# Patient Record
Sex: MALE | Race: ASIAN | URBAN - METROPOLITAN AREA
[De-identification: names, ages, dates, MRNs, and addresses within clinical notes are randomized per-mention and may not be internally consistent; named-entity substitution may affect disease eponyms.]

---

## 2019-06-26 ENCOUNTER — OFFICE VISIT (OUTPATIENT)
Dept: PSYCHIATRY | Facility: CLINIC | Age: 29
End: 2019-06-26
Payer: COMMERCIAL

## 2019-06-26 DIAGNOSIS — F33.1 DEPRESSION, MAJOR, RECURRENT, MODERATE: Primary | ICD-10-CM

## 2019-06-26 DIAGNOSIS — F40.10 SOCIAL ANXIETY DISORDER: ICD-10-CM

## 2019-06-26 PROCEDURE — 99999 PR PBB SHADOW E&M-NEW PATIENT-LVL I: CPT | Mod: PBBFAC,,, | Performed by: SOCIAL WORKER

## 2019-06-26 PROCEDURE — 99999 PR PBB SHADOW E&M-NEW PATIENT-LVL I: ICD-10-PCS | Mod: PBBFAC,,, | Performed by: SOCIAL WORKER

## 2019-06-26 PROCEDURE — 90791 PR PSYCHIATRIC DIAGNOSTIC EVALUATION: ICD-10-PCS | Mod: S$GLB,,, | Performed by: SOCIAL WORKER

## 2019-06-26 PROCEDURE — 90791 PSYCH DIAGNOSTIC EVALUATION: CPT | Mod: S$GLB,,, | Performed by: SOCIAL WORKER

## 2019-06-26 NOTE — PROGRESS NOTES
Psychiatry Initial Visit (PhD/LCSW)  Diagnostic Interview - CPT 97775    Date: 6/26/2019    Site: [facility]    Referral source: self    Clinical status of patient: Outpatient    Diana Vance, a 28 y.o. male, for initial evaluation visit.  Met with patient.    Chief complaint/reason for encounter: depression and anxiety    History of present illness: Pt states he is here to establish treatment with mental health providers.  He has a long history of depression and social anxiety going back to high school when he first received treatment for it.  He recently (February) moved to [place] following graduate school in [place].  Pt was born in [place] and spent his early childhood there.  Family then moved to [facility] where he went to debbie high and high school.  He disliked his high school and feels he did not learn social skills due to no friends there.  He was sent to college in [place] where he got very depressed for his first 2 years, then went on a debbie year abroad in [place] and felt he blossomed, got out of his depression and returned to college in [place] and was very active in many things and enjoyed himself.  He then went to graduate school in [place] and got a degree in Business .  He just got a job with a new company in [place].  He is finding it difficult to make friends but has made an effort to get involved in yoga classes, a gym.  He feels he doesn't know how to talk to people at work even though they are all around his age.  He was excited when I mentioned group therapy as a possibility to help him.      Pain: 0    Symptoms:   · Mood: depressed mood, weight gain and social isolation  · Anxiety: social phobia  · Substance abuse: denied  · Cognitive functioning: denied  · Health behaviors: noncontributory    Psychiatric history: has participated in counseling/psychotherapy on an outpatient basis in the past    Medical history: noncontributory    Family history of  psychiatric illness: none    Social history (marriage, employment, etc.): Recently moved to [place]. Heterosexual.  He has a younger sister living in [place] who he is close to.  His parents live in [facility].  Bright and motivated for change.    Substance use:   Alcohol: social   Drugs: none   Tobacco: none   Caffeine: none    Current medications and drug reactions (include OTC, herbal): see medication list:  Lithium, Lexapro, Abilify    Strengths and liabilities: Strength: Patient accepts guidance/feedback, Strength: Patient is expressive/articulate., Strength: Patient is intelligent., Strength: Patient is motivated for change., Strength: Patient is physically healthy., Strength: Patient has reasonable judgment., Strength: Patient is stable., Liability: Patient lacks social skills., Liability: Patient has no suport network., Liability: Patient lacks coping skills.    Current Evaluation:     Mental Status Exam:  General Appearance:  unremarkable, age appropriate, overweight   Speech: normal tone, normal rate, normal pitch, normal volume      Level of Cooperation: cooperative      Thought Processes: normal and logical   Mood: steady      Thought Content: normal, no suicidality, no homicidality, delusions, or paranoia   Affect: congruent and appropriate   Orientation: Oriented x3   Memory: intact   Attention Span & Concentration: intact   Fund of General Knowledge: intact and appropriate to age and level of education   Abstract Reasoning: did not assess   Judgment & Insight: good     Language  intact     Diagnostic Impression - Plan:       ICD-10-CM ICD-9-CM   1. Depression, major, recurrent, moderate F33.1 296.32   2. Social anxiety disorder F40.10 300.23       Plan:individual psychotherapy, group psychotherapy and consult psychiatrist for medication evaluation    Return to Clinic: 3 weeks    Length of Service (minutes): 45

## 2019-07-01 ENCOUNTER — OFFICE VISIT (OUTPATIENT)
Dept: PSYCHIATRY | Facility: CLINIC | Age: 29
End: 2019-07-01
Payer: COMMERCIAL

## 2019-07-01 DIAGNOSIS — F40.10 SOCIAL ANXIETY DISORDER: Primary | ICD-10-CM

## 2019-07-01 PROCEDURE — 99999 PR PBB SHADOW E&M-EST. PATIENT-LVL I: ICD-10-PCS | Mod: PBBFAC,,, | Performed by: SOCIAL WORKER

## 2019-07-01 PROCEDURE — 99999 PR PBB SHADOW E&M-EST. PATIENT-LVL I: CPT | Mod: PBBFAC,,, | Performed by: SOCIAL WORKER

## 2019-07-01 PROCEDURE — 90832 PSYTX W PT 30 MINUTES: CPT | Mod: S$GLB,,, | Performed by: SOCIAL WORKER

## 2019-07-01 PROCEDURE — 90832 PR PSYCHOTHERAPY W/PATIENT, 30 MIN: ICD-10-PCS | Mod: S$GLB,,, | Performed by: SOCIAL WORKER

## 2019-07-01 NOTE — PROGRESS NOTES
Individual Psychotherapy (PhD/LCSW)    7/1/2019    Site:  [facility]         Therapeutic Intervention: Met with patient.  Outpatient - Insight oriented psychotherapy 30 min - CPT code 74497    Chief complaint/reason for encounter: anxiety, behavior and interpersonal     Interval history and content of current session: referral for group therapy from Dr. Rivera, client has social anxiety, new to the area, is professional, wants to work on social and peer skills, reducing anxiety, and improving his self-esteem, and feeling more confident about taking risks. Will start the Wednesday 5:15 PM group this week. Went over guidelines, including informed consent. And how to be in the group and how it works and open up and talk, I have consulted with Dr. Rivera on the client also, he sees an MD here in August.    Treatment plan:  · Target symptoms: anxiety , adjustment  · Why chosen therapy is appropriate versus another modality: relevant to diagnosis, patient responds to this modality, evidence based practice  · Outcome monitoring methods: self-report, observation  · Therapeutic intervention type: insight oriented psychotherapy, behavior modifying psychotherapy, supportive psychotherapy    Risk parameters:  Patient reports no suicidal ideation  Patient reports no homicidal ideation  Patient reports no self-injurious behavior  Patient reports no violent behavior    Verbal deficits: None    Patient's response to intervention:  The patient's response to intervention is accepting.    Progress toward goals and other mental status changes:  The patient's progress toward goals is fair .    Diagnosis:     ICD-10-CM ICD-9-CM   1. Social anxiety disorder F40.10 300.23       Plan:  individual psychotherapy, group psychotherapy and consult psychiatrist for medication evaluation    Return to clinic: 1 week    Length of Service (minutes): 30

## 2019-07-03 ENCOUNTER — OFFICE VISIT (OUTPATIENT)
Dept: PSYCHIATRY | Facility: CLINIC | Age: 29
End: 2019-07-03
Payer: COMMERCIAL

## 2019-07-03 DIAGNOSIS — F40.10 SOCIAL ANXIETY DISORDER: Primary | ICD-10-CM

## 2019-07-03 PROCEDURE — 99999 PR PBB SHADOW E&M-EST. PATIENT-LVL I: ICD-10-PCS | Mod: PBBFAC,,, | Performed by: SOCIAL WORKER

## 2019-07-03 PROCEDURE — 99999 PR PBB SHADOW E&M-EST. PATIENT-LVL I: CPT | Mod: PBBFAC,,, | Performed by: SOCIAL WORKER

## 2019-07-03 PROCEDURE — 90853 GROUP PSYCHOTHERAPY: CPT | Mod: PBBFAC | Performed by: SOCIAL WORKER

## 2019-07-03 PROCEDURE — 90853 PR GROUP PSYCHOTHERAPY: ICD-10-PCS | Mod: S$GLB,,, | Performed by: SOCIAL WORKER

## 2019-07-03 PROCEDURE — 90853 GROUP PSYCHOTHERAPY: CPT | Mod: S$GLB,,, | Performed by: SOCIAL WORKER

## 2019-07-30 ENCOUNTER — PATIENT MESSAGE (OUTPATIENT)
Dept: PSYCHIATRY | Facility: CLINIC | Age: 29
End: 2019-07-30

## 2019-08-13 ENCOUNTER — OFFICE VISIT (OUTPATIENT)
Dept: PSYCHIATRY | Facility: CLINIC | Age: 29
End: 2019-08-13
Payer: COMMERCIAL

## 2019-08-13 ENCOUNTER — PATIENT MESSAGE (OUTPATIENT)
Dept: PSYCHIATRY | Facility: CLINIC | Age: 29
End: 2019-08-13

## 2019-08-13 VITALS
HEART RATE: 89 BPM | BODY MASS INDEX: 37.26 KG/M2 | DIASTOLIC BLOOD PRESSURE: 81 MMHG | HEIGHT: 71 IN | SYSTOLIC BLOOD PRESSURE: 131 MMHG | WEIGHT: 266.13 LBS

## 2019-08-13 DIAGNOSIS — F40.10 SOCIAL ANXIETY DISORDER: Primary | ICD-10-CM

## 2019-08-13 DIAGNOSIS — F33.40 MDD (RECURRENT MAJOR DEPRESSIVE DISORDER) IN REMISSION: ICD-10-CM

## 2019-08-13 PROCEDURE — 99999 PR PBB SHADOW E&M-EST. PATIENT-LVL II: CPT | Mod: PBBFAC,,, | Performed by: PSYCHIATRY & NEUROLOGY

## 2019-08-13 PROCEDURE — 99214 PR OFFICE/OUTPT VISIT, EST, LEVL IV, 30-39 MIN: ICD-10-PCS | Mod: S$GLB,,, | Performed by: PSYCHIATRY & NEUROLOGY

## 2019-08-13 PROCEDURE — 99214 OFFICE O/P EST MOD 30 MIN: CPT | Mod: S$GLB,,, | Performed by: PSYCHIATRY & NEUROLOGY

## 2019-08-13 PROCEDURE — 99999 PR PBB SHADOW E&M-EST. PATIENT-LVL II: ICD-10-PCS | Mod: PBBFAC,,, | Performed by: PSYCHIATRY & NEUROLOGY

## 2019-08-13 RX ORDER — ARIPIPRAZOLE 10 MG/1
10 TABLET ORAL DAILY
Qty: 30 TABLET | Refills: 3 | Status: SHIPPED | OUTPATIENT
Start: 2019-08-13 | End: 2019-12-09 | Stop reason: SDUPTHER

## 2019-08-13 RX ORDER — TRIHEXYPHENIDYL HYDROCHLORIDE 2 MG/1
2 TABLET ORAL DAILY
Qty: 30 TABLET | Refills: 3 | Status: SHIPPED | OUTPATIENT
Start: 2019-08-13 | End: 2019-12-09 | Stop reason: SDUPTHER

## 2019-08-13 RX ORDER — ESCITALOPRAM OXALATE 20 MG/1
20 TABLET ORAL DAILY
Qty: 30 TABLET | Refills: 3 | Status: SHIPPED | OUTPATIENT
Start: 2019-08-13 | End: 2019-12-09 | Stop reason: SDUPTHER

## 2019-08-13 NOTE — PROGRESS NOTES
Outpatient Psychiatry Initial Visit (MD/NP)    8/13/2019    Diana Vance, a 29 y.o. male, presenting for initial evaluation visit. Met with patient and chart reviewed. Pt saw Dr. Forrest on 6/26 l dx with social anxiety disorder and MDD, was referred for medication management    Reason for Encounter: Referral from Dr. Forrest. Patient complains of No chief complaint on file.      History of Present Illness:     Patient saw Dr. Forrest on 6/26, Per note:  Pt states he is here to establish treatment with mental health providers.  He has a long history of depression and social anxiety going back to high school when he first received treatment for it.  He recently (February) moved to [place] following graduate school in [place].  Pt was born in [place] and spent his early childhood there.  Family then moved to [facility] where he went to debbie high and high school.  He disliked his high school and feels he did not learn social skills due to no friends there.  He was sent to college in [place] where he got very depressed for his first 2 years, then went on a debbie year abroad in [place] and felt he blossomed, got out of his depression and returned to college in [place] and was very active in many things and enjoyed himself.  He then went to graduate school in [place] and got a degree in Business .  He just got a job with a new company in [place].  He is finding it difficult to make friends but has made an effort to get involved in yoga classes, a gym.  He feels he doesn't know how to talk to people at work even though they are all around his age.  He was excited when I mentioned group therapy as a possibility to help him.        Today pt states that he needs refills of his medications. He recently moved from [place] to [facility] in February after he got a job as a . He had a psychiatrist in [place] who prescribed his medications and Is interested in reducing doses of his current meds.  "Pt reports a h/o anxiety and depression and brings medications bottles to appointment today.  Meds: abilify 10 mg qdaym lexapro 20 mg, trihexyphenidyl 2 mg daily, lithium 450 mg BID. Pt states he has been on the lithium and lexapro for 5-6 years. They were started when he was in [facility], states that he was started on lithium when he was having suicidal thoughts and denies a h/o bipolar disorder. Denies previous psych hospitalizations. Harini screen negative as below.  He has been on the artane and abilify about  2-3 years. He states that since he has been on all 4  He feels that he is doing "ok" and describes his mood as "good". Pt states he has been has been settling into job and has been going to [employer] and doing yoga to get in shape. Energy is "good" (pt states he has been more active), denies anhedonia (enjoys photography), denies guilt/hopelessness, reports good appetite. Says he has "always" had difficulties concentrating but meditation has helped, sleeps well (states he gets at least 8 hours) .  Pt states that he first noticed he was depressed and anxious while in high school in [place]. He moved to [place] for [facility] and reached out to mental health services for the first time. This was when he was started on lexapro and lithium as above. He then moved to [place] and completed grad school.     Denies periods of 4+days or irritable/elevated mood with decreased need for sleep, increased energy, increased goal directed. Endorses talkativeness and racing thoughts at times. Denies grandiosity.  Denies AVH/paranoia/TI/TW/TB.Denies SI/HI. He states that he worries about what other people think about him. States he has social anxiety and fears that people may  him, denies that it prevents him from doig activities that he wants to do. States that social situations usually provokes his anxiety and recognizes that it is out of proportion to the stressor. Pt states that overall he feels that his social " anxiety has improved as he recently went to a speed dating event.   Describes normal day as going to work from 9-5, goes to the gym or does yoga/working out/spending time with friends, eats dinner, watch TV.    Has lost 13 lbs over the past couple months, wants to lose another 50-55    When discussing lithium level he is unsure when he last had a lithium level  And is unable to recall any previous numbers. Endorses occasional marijuana and etoh intake      Psych History  Previous Medication Trials: as above only  Previous Psychiatric Hospitalizations: no  Previous Suicide Attempts: denies  History of Violence: denies  Outpatient Psychiatrist: previously saw MD in [place]     Social History:  Marital Status: single  Children: no  Employment Status/Info: data analyast  Education: [employer] grad school  Special Ed: denies  : denies  Scientologist: raised Sabianism, but says he is not Restorationist.  Housing Status: alone  Hobbies/Leisure time: photography, reading books , yoga, working out  History of phys/sexual abuse: no  Access to gun: no  H/o head trauma: denies  Seizure: denes     Family Psychiatric History:   denies     Substance Abuse History:  Recreational Drugs: occasional marijuana. Denies synthetic marijuana  Use of Alcohol: yes, occasional when he is with friends  Rehab History: denies  Tobacco Use: denies  Use of Caffeine: 1 cup of coffee/day  Use of OTC: occasional tylenol  Legal consequences of chemical use:denies     Legal History:  Past Charges/Incarcerations: denies  Pending charges:no       Review Of Systems:     GENERAL:  No weight gain or loss  SKIN:  No rashes  HEAD:  No headaches  EARS:  No ddizzinessor hearing loss  NOSE:  No changes in smell  MOUTH & THROAT:  No dyskinetic movements or obvious goiter  CHEST:  No shortness of breath, + cough  CARDIOVASCULAR:  No tachycardia or chest pain  ABDOMEN:  No nausea, vomiting, pain, constipation or diarrhea  URINARY:  No frequency, dysuria  "  MUSCULOSKELETAL:  No pain or stiffness of the joints  NEUROLOGIC:  No weakness, sensory changes, seizures, confusion, memory loss, tremor or other abnormal movements  Pertinent items are noted in HPI.    Current Evaluation:     Nutritional Screening: Considering the patient's height and weight, medications, medical history and preferences, should a referral be made to the dietitian? yes    Constitutional  Vitals:  Most recent vital signs, dated less than 90 days prior to this appointment, were reviewed.    Vitals:    08/13/19 0827   BP: 131/81   Pulse: 89   Weight: 120.7 kg (266 lb 1.5 oz)   Height: 5' 11" (1.803 m)        General:  unremarkable, age appropriate     Musculoskeletal  Muscle Strength/Tone:  no spasicity, no rigidity, no dystonia, no tremor   Gait & Station:  non-ataxic     Psychiatric  Speech:  no latency; no press   Mood & Affect:  happy  congruent and appropriate   Thought Process:  normal and logical   Associations:  intact   Thought Content:  normal, no suicidality, no homicidality, delusions, or paranoia   Insight:  intact   Judgement: behavior is adequate to circumstances   Orientation:  grossly intact   Memory: intact for content of interview   Language: grossly intact   Attention Span & Concentration:  able to focus   Fund of Knowledge:  intact and appropriate to age and level of education, 4 of 4 recent presidents       Relevant Elements of Neurological Exam: normal gait    Laboratory Data  No visits with results within 1 Month(s) from this visit.   Latest known visit with results is:   No results found for any previous visit.         Medications  No outpatient encounter medications on file as of 8/13/2019.     No facility-administered encounter medications on file as of 8/13/2019.            Assessment - Diagnosis - Goals:     Impression:   30 yo male with h/o reported depression and social anxiety disorder who presents to establish care and get medications refilled after recent move from [place] to [place]. On evaluation pt reports that his mood is "good" and denies feeling depression or anhedonic. Govind and psychosis screen negative and No Objective s/sx of psychosis or govind on interview and exam. Pt presents with desire to simplify medication regimen; he presents with somewhat complicated medication regimen of 20 mg lexapro, 10 mg abilify, 2mg artane and lithium 450 mg BID. Unclear why pt is on current regimen , as he does not appear by hx  to have ever met criteria for bipolar or SAD nd does not appear objectively psychotic or manic currently. Discussed indications and risks/benefits of all current medications and pt agreeable to decrease lithium to 450 mg qhs for now. Pt also agreeable to release information from previous provider-sign CARLA and will fax to previous MD.     MDD, in remission currently  R/O schizoaffective disorder, R/o Bipolar disorder  Social   Anxiety disorder      No diagnosis found.    Strengths and Liabilities: Strength: Patient accepts guidance/feedback, Strength: Patient is expressive/articulate., Strength: Patient is intelligent., Strength: Patient is motivated for change., Strength: Patient has reasonable judgment.    Treatment Goals:  Specify outcomes written in observable, behavioral terms:   Anxiety: eliminating avoidance (specify : going out more with friends)  Depression: acquiring relapse prevention skills    Treatment Plan/Recommendations:   · Will decrease lithium 450 qhs. Consider lithium level at next appintment  · Continue lexapro 20 mg   · Continue artane 2 mg   · Continue abilify 10 mg  · Will consider further med adjustments once receive previous records  · Continue therapy with Dr. Forrest        Return to Clinic: 1 month    Counseling time: 15  Total time: 60  Case discussed with Dr. Britany Fraire MD  [facility] PGY3  "

## 2019-08-27 NOTE — PROGRESS NOTES
STAFF NOTE:  I have seen the pt and I have fully reviewed patient's medical record. Case formally discussed with resident and I concur with the resident's history, assessment, and recommendations.

## 2019-09-03 ENCOUNTER — OFFICE VISIT (OUTPATIENT)
Dept: INTERNAL MEDICINE | Facility: CLINIC | Age: 29
End: 2019-09-03
Payer: COMMERCIAL

## 2019-09-03 VITALS
HEIGHT: 71 IN | SYSTOLIC BLOOD PRESSURE: 114 MMHG | TEMPERATURE: 100 F | HEART RATE: 93 BPM | DIASTOLIC BLOOD PRESSURE: 70 MMHG | WEIGHT: 271.63 LBS | BODY MASS INDEX: 38.03 KG/M2

## 2019-09-03 DIAGNOSIS — J40 BRONCHITIS: ICD-10-CM

## 2019-09-03 DIAGNOSIS — E66.9 OBESITY (BMI 30-39.9): ICD-10-CM

## 2019-09-03 DIAGNOSIS — R05.9 COUGH: ICD-10-CM

## 2019-09-03 DIAGNOSIS — R06.2 WHEEZING: Primary | ICD-10-CM

## 2019-09-03 PROCEDURE — 99999 PR PBB SHADOW E&M-EST. PATIENT-LVL IV: ICD-10-PCS | Mod: PBBFAC,,, | Performed by: NURSE PRACTITIONER

## 2019-09-03 PROCEDURE — 96372 THER/PROPH/DIAG INJ SC/IM: CPT | Mod: S$GLB,,, | Performed by: NURSE PRACTITIONER

## 2019-09-03 PROCEDURE — 96372 PR INJECTION,THERAP/PROPH/DIAG2ST, IM OR SUBCUT: ICD-10-PCS | Mod: S$GLB,,, | Performed by: NURSE PRACTITIONER

## 2019-09-03 PROCEDURE — 99204 OFFICE O/P NEW MOD 45 MIN: CPT | Mod: 25,S$GLB,, | Performed by: NURSE PRACTITIONER

## 2019-09-03 PROCEDURE — 99204 PR OFFICE/OUTPT VISIT, NEW, LEVL IV, 45-59 MIN: ICD-10-PCS | Mod: 25,S$GLB,, | Performed by: NURSE PRACTITIONER

## 2019-09-03 PROCEDURE — 99999 PR PBB SHADOW E&M-EST. PATIENT-LVL IV: CPT | Mod: PBBFAC,,, | Performed by: NURSE PRACTITIONER

## 2019-09-03 RX ORDER — AZITHROMYCIN 250 MG/1
TABLET, FILM COATED ORAL
Qty: 6 TABLET | Refills: 0 | Status: SHIPPED | OUTPATIENT
Start: 2019-09-03 | End: 2019-09-08

## 2019-09-03 RX ORDER — TRIAMCINOLONE ACETONIDE 40 MG/ML
40 INJECTION, SUSPENSION INTRA-ARTICULAR; INTRAMUSCULAR ONCE
Status: COMPLETED | OUTPATIENT
Start: 2019-09-03 | End: 2019-09-03

## 2019-09-03 RX ADMIN — TRIAMCINOLONE ACETONIDE 40 MG: 40 INJECTION, SUSPENSION INTRA-ARTICULAR; INTRAMUSCULAR at 08:09

## 2019-09-03 NOTE — PROGRESS NOTES
[facility] Note    Chief Complaint      Chief Complaint   Patient presents with    Annual Exam     History of Present Illness      Diana Vance is a 29 y.o. male patient new to me with chronic conditions of social anxiety d/o and depression who presents today for c/o new problem of worsening sore throat, chest congestion, cough with yellow phlegm production and feelings of fever but not measured for the past week. Pt denies sob, cp, n/v/d.   Pt is requesting referral to a nutritionist for weight loss/management. Pt moved to [place] this past Feb for his job, has been under a lot of stress. He just recently joined [employer] and is working out twice a week w/o much weight loss.   BMI- 37.8   Social anxiety/depression- managed by psych    Problem List Items Addressed This Visit     None      Visit Diagnoses     Wheezing    -  Primary    Relevant Medications    azithromycin (Z-ARINA) 250 MG tablet    triamcinolone acetonide injection 40 mg (Completed)    Bronchitis        Relevant Medications    azithromycin (Z-ARINA) 250 MG tablet    triamcinolone acetonide injection 40 mg (Completed)    Cough        Relevant Medications    azithromycin (Z-ARINA) 250 MG tablet    triamcinolone acetonide injection 40 mg (Completed)    Obesity (BMI 30-39.9)        Relevant Orders    Ambulatory Referral to Nutrition Services          Health Maintenance   Topic Date Due    TETANUS VACCINE  07/02/2009    Lipid Panel  Completed       History reviewed. No pertinent past medical history.    History reviewed. No pertinent surgical history.    family history is not on file.     Social History     Tobacco Use    Smoking status: Never Smoker    Smokeless tobacco: Never Used   Substance Use Topics    Alcohol use: Not on file    Drug use: Not on file       Review of Systems   Constitutional: Positive for fever. Negative for chills.   HENT: Positive for congestion (chest) and sore throat. Negative for hearing loss  "and sinus pain.    Eyes: Negative for blurred vision and discharge.   Respiratory: Positive for cough and sputum production. Negative for shortness of breath and wheezing.    Cardiovascular: Negative for chest pain, palpitations and leg swelling.   Gastrointestinal: Negative for abdominal pain, blood in stool, constipation, diarrhea, nausea and vomiting.   Genitourinary: Negative for dysuria, hematuria and urgency.   Musculoskeletal: Negative for myalgias and neck pain.   Skin: Negative for rash.   Neurological: Negative for dizziness, weakness and headaches.   Endo/Heme/Allergies: Negative for polydipsia.   Psychiatric/Behavioral: Negative for depression. The patient is not nervous/anxious.         Outpatient Encounter Medications as of 9/3/2019   Medication Sig Dispense Refill    ARIPiprazole (ABILIFY) 10 MG Tab Take 1 tablet (10 mg total) by mouth once daily. 30 tablet 3    escitalopram oxalate (LEXAPRO) 20 MG tablet Take 1 tablet (20 mg total) by mouth once daily. 30 tablet 3    trihexyphenidyl (ARTANE) 2 MG tablet Take 1 tablet (2 mg total) by mouth once daily. 30 tablet 3    azithromycin (Z-ARINA) 250 MG tablet Take 2 tablets by mouth on day 1; Take 1 tablet by mouth on days 2-5 6 tablet 0     Facility-Administered Encounter Medications as of 9/3/2019   Medication Dose Route Frequency Provider Last Rate Last Dose    [COMPLETED] triamcinolone acetonide injection 40 mg  40 mg Intramuscular Once Alberta Ruiz NP   40 mg at 09/03/19 0836       Review of patient's allergies indicates:  No Known Allergies    Physical Exam      Vital Signs  Temp: 99.6 °F (37.6 °C)  Temp src: Oral  Pulse: 93  BP: 114/70  BP Location: Left arm  Patient Position: Sitting  Pain Score: 0-No pain  Height and Weight  Height: 5' 11" (180.3 cm)  Weight: 123.2 kg (271 lb 9.7 oz)  BSA (Calculated - sq m): 2.48 sq meters  BMI (Calculated): 38  Weight in (lb) to have BMI = 25: 178.9]    Physical Exam   Constitutional: He is oriented to " person, place, and time. He appears well-developed and well-nourished.   HENT:   Head: Normocephalic and atraumatic.   Slight redness noted to bilateral ear canal and oropharynx   Eyes: Pupils are equal, round, and reactive to light. Conjunctivae and EOM are normal.   Neck: Normal range of motion. Neck supple. No JVD present. No tracheal deviation present. No thyromegaly present.   Cardiovascular: Normal rate, regular rhythm, normal heart sounds and intact distal pulses.   Pulmonary/Chest: Effort normal and breath sounds normal. No respiratory distress.   Abdominal: Soft. Bowel sounds are normal. He exhibits no distension. There is no tenderness.   Musculoskeletal: Normal range of motion.   Lymphadenopathy:     He has no cervical adenopathy.   Neurological: He is alert and oriented to person, place, and time.   Skin: Skin is warm and dry. No rash noted. No erythema. No pallor.   Psychiatric: He has a normal mood and affect. His behavior is normal. Judgment and thought content normal.        Laboratory:  CBC:  No results for input(s): WBC, RBC, HGB, HCT, PLT, MCV, MCH, MCHC in the last 2160 hours.  CMP:  No results for input(s): GLU, CALCIUM, ALBUMIN, PROT, NA, K, CO2, CL, BUN, ALKPHOS, ALT, AST, BILITOT in the last 2160 hours.    Invalid input(s): CREATININ  URINALYSIS:  No results for input(s): COLORU, CLARITYU, SPECGRAV, PHUR, PROTEINUA, GLUCOSEU, BILIRUBINCON, BLOODU, WBCU, RBCU, BACTERIA, MUCUS, NITRITE, LEUKOCYTESUR, UROBILINOGEN, HYALINECASTS in the last 2160 hours.   LIPIDS:  No results for input(s): TSH, HDL, CHOL, TRIG, LDLCALC, CHOLHDL, NONHDLCHOL, TOTALCHOLEST in the last 2160 hours.  TSH:  No results for input(s): TSH in the last 2160 hours.  A1C:  No results for input(s): HGBA1C in the last 2160 hours.      Assessment/Plan     Diana Vance is a 29 y.o.male with:    1. Bronchitis  - azithromycin (Z-ARINA) 250 MG tablet; Take 2 tablets by mouth on day 1; Take 1 tablet by mouth on days 2-5   Dispense: 6 tablet; Refill: 0  - triamcinolone acetonide injection 40 mg    2. Wheezing  - azithromycin (Z-ARINA) 250 MG tablet; Take 2 tablets by mouth on day 1; Take 1 tablet by mouth on days 2-5  Dispense: 6 tablet; Refill: 0  - triamcinolone acetonide injection 40 mg    3. Cough  - azithromycin (Z-ARINA) 250 MG tablet; Take 2 tablets by mouth on day 1; Take 1 tablet by mouth on days 2-5  Dispense: 6 tablet; Refill: 0  - triamcinolone acetonide injection 40 mg    4. Obesity (BMI 30-39.9)  - Ambulatory Referral to Nutrition Services      -Continue current medications and maintain follow up with specialists.  Return to clinic in 3 months to establish with PCP, as a f/u with weight loss/health and annual labs.         Erin Jiminez, NP-C [facility]

## 2019-09-03 NOTE — PATIENT INSTRUCTIONS
Referred to nutritionist for weight loss management/health  Complete meds prescribed  F/u with 3 months to establish with PCP, annual labs and to f/u with weight loss.

## 2019-09-10 ENCOUNTER — OFFICE VISIT (OUTPATIENT)
Dept: PSYCHIATRY | Facility: CLINIC | Age: 29
End: 2019-09-10
Payer: COMMERCIAL

## 2019-09-10 VITALS
DIASTOLIC BLOOD PRESSURE: 75 MMHG | WEIGHT: 269.94 LBS | HEART RATE: 89 BPM | BODY MASS INDEX: 37.65 KG/M2 | SYSTOLIC BLOOD PRESSURE: 136 MMHG

## 2019-09-10 DIAGNOSIS — Z00.00 HEALTHCARE MAINTENANCE: Primary | ICD-10-CM

## 2019-09-10 DIAGNOSIS — F33.40 MDD (RECURRENT MAJOR DEPRESSIVE DISORDER) IN REMISSION: ICD-10-CM

## 2019-09-10 DIAGNOSIS — F40.10 SOCIAL ANXIETY DISORDER: ICD-10-CM

## 2019-09-10 PROCEDURE — 99999 PR PBB SHADOW E&M-EST. PATIENT-LVL II: CPT | Mod: PBBFAC,,, | Performed by: PSYCHIATRY & NEUROLOGY

## 2019-09-10 PROCEDURE — 99212 OFFICE O/P EST SF 10 MIN: CPT | Mod: S$GLB,,, | Performed by: PSYCHIATRY & NEUROLOGY

## 2019-09-10 PROCEDURE — 99212 PR OFFICE/OUTPT VISIT, EST, LEVL II, 10-19 MIN: ICD-10-PCS | Mod: S$GLB,,, | Performed by: PSYCHIATRY & NEUROLOGY

## 2019-09-10 PROCEDURE — 99999 PR PBB SHADOW E&M-EST. PATIENT-LVL II: ICD-10-PCS | Mod: PBBFAC,,, | Performed by: PSYCHIATRY & NEUROLOGY

## 2019-09-10 NOTE — PROGRESS NOTES
"Outpatient Psychiatry Follow-Up Visit (MD/NP)    9/10/2019    Clinical Status of Patient:  Outpatient (Ambulatory)    Chief Complaint:  Diana Vance is a 29 y.o. male who presents today for follow-up of depression, social anxiety disorder.  Met with patient.  Patient last seen by me 8/13/2019, at that time he was continued on lexapro 20 mg, artane 2 mg, and abilify 10 mg. Lithium was decreased to 450 qhs as it was unclear what the indications where. Requested records from previous provider, have not received any records back    Pt states that he has been doing well since his last appointment, he states he has been more social and made some new friends. Pt states that he is happy with the changes that were made at the last appointment andfeels that he is "starting to feel emotions again". He describes his mood as "better" and states that he only feel "depressed" when he is in his apartment alone. He states that the "depression" is more feelings of loneliness because these feelings resolve [facility]. Pt states that his job has been "good", no issues at work. Pt states that he has been sleeping 8-9 hours a day but on the weekends he has been sleeping 10-12 hours because he "has nothing to do" and is considering a new hobby. He is considering buying a nintendo switch and getting back into exercising. Pt states that since his last appointment he gained ~5 lbs which he attributes to going out with some family members when they were visiting him in town. Pt states that he saw a PCP who had recommended a nutrionist and his hoping that will aid in weight loss as he feels his confidence will improve with wr loss. Denies feeling anxious and states when he does he meditates and it improves. No sx c/w govind or psychosis; not objectively manic pr psychotic, denie sSI/HI/AVH. Pt describes himself as "more positive", states that he is more hopeful than he has in a long time in regards to " "controlling his depression.      Review of Systems   · PSYCHIATRIC: Pertinant items are noted in the narrative.    Past Medical, Family and Social History: The patient's past medical, family and social history have been reviewed and updated as appropriate within the electronic medical record - see encounter notes.    Compliance: yes    Side effects: None    Risk Parameters:  Patient reports no suicidal ideation  Patient reports no homicidal ideation  Patient reports no self-injurious behavior  Patient reports no violent behavior    Exam (detailed: at least 9 elements; comprehensive: all 15 elements)   Constitutional  Vitals:  Most recent vital signs, dated less than 90 days prior to this appointment, were reviewed.   Vitals:    09/10/19 0929   BP: 136/75   Pulse: 89   Weight: 122.4 kg (269 lb 15.3 oz)        General:  unremarkable, age appropriate, casually dressed     Musculoskeletal  Muscle Strength/Tone:  no spasicity, no rigidity, no flaccidity, no dyskinesia, no tremor   Gait & Station:  non-ataxic, steady     Psychiatric  Speech:  no latency; no press   Mood & Affect:  "better"  congruent and appropriate, bright, full, reactive,    Thought Process:  normal and logical   Associations:  intact   Thought Content:  normal, no suicidality, no homicidality, delusions, or paranoia, patient making jokes   Insight:  intact, has awareness of illness   Judgement: behavior is adequate to circumstances   Orientation:  grossly intact   Memory: intact for content of interview   Language: grossly intact   Attention Span & Concentration:  able to focus   Fund of Knowledge:  intact and appropriate to age and level of education     Assessment and Diagnosis   Status/Progress: Based on the examination today, the patient's problem(s) is/are improved.  New problems have not been presented today.   Diagnostic uncertainty are complicating management of the primary condition.  The working differential for this patient includes MDD and " social anxiety disorder. Would like to r/o SAD and bipolar disorder as patient presnted on a complicated medication regimen. No objective evidence of psychosis or govind at this time. Pt appears to be doing well on decreased lithium dose, but would like to obtain records from previous provider prior to discontinuing medication.     General Impression:     MDD, in remission currently  R/O schizoaffective disorder, R/o Bipolar disorder  Social Anxiety disorder          Intervention/Counseling/Treatment Plan       Treatment Goals:  Specify outcomes written in observable, behavioral terms:   Anxiety: eliminating avoidance (specify : going out more with friends)  Depression: acquiring relapse prevention skills     Treatment Plan/Recommendations:   · Continue lithium 450 qhs.   · Continue lexapro 20 mg   · Continue artane 2 mg   · Continue abilify 10 mg  · Will consider further med adjustments once receive previous records-pt signed CARLA at last visit 8/13-awaiting records  · Ordered lithium level and lithium monitoring labs today 9/10/19  · Continue therapy with Dr. Forrest       Return to Clinic: 3 months or sooner if needed  Jordyn Fraire MD  [employer] [facility] PGY3

## 2019-12-03 ENCOUNTER — TELEPHONE (OUTPATIENT)
Dept: INTERNAL MEDICINE | Facility: CLINIC | Age: 29
End: 2019-12-03

## 2019-12-03 ENCOUNTER — OFFICE VISIT (OUTPATIENT)
Dept: INTERNAL MEDICINE | Facility: CLINIC | Age: 29
End: 2019-12-03
Payer: COMMERCIAL

## 2019-12-03 VITALS
WEIGHT: 264.13 LBS | HEIGHT: 71 IN | DIASTOLIC BLOOD PRESSURE: 82 MMHG | BODY MASS INDEX: 36.98 KG/M2 | SYSTOLIC BLOOD PRESSURE: 130 MMHG | TEMPERATURE: 99 F | RESPIRATION RATE: 18 BRPM | HEART RATE: 78 BPM

## 2019-12-03 DIAGNOSIS — Z00.00 ANNUAL PHYSICAL EXAM: Primary | ICD-10-CM

## 2019-12-03 DIAGNOSIS — F33.40 MDD (RECURRENT MAJOR DEPRESSIVE DISORDER) IN REMISSION: ICD-10-CM

## 2019-12-03 DIAGNOSIS — E66.9 OBESITY (BMI 30-39.9): ICD-10-CM

## 2019-12-03 DIAGNOSIS — F40.10 SOCIAL ANXIETY DISORDER: ICD-10-CM

## 2019-12-03 PROCEDURE — 99999 PR PBB SHADOW E&M-EST. PATIENT-LVL III: ICD-10-PCS | Mod: PBBFAC,,, | Performed by: INTERNAL MEDICINE

## 2019-12-03 PROCEDURE — 99395 PREV VISIT EST AGE 18-39: CPT | Mod: 25,S$GLB,, | Performed by: INTERNAL MEDICINE

## 2019-12-03 PROCEDURE — 90471 FLU VACCINE (QUAD) GREATER THAN OR EQUAL TO 3YO PRESERVATIVE FREE IM: ICD-10-PCS | Mod: S$GLB,,, | Performed by: INTERNAL MEDICINE

## 2019-12-03 PROCEDURE — 90686 FLU VACCINE (QUAD) GREATER THAN OR EQUAL TO 3YO PRESERVATIVE FREE IM: ICD-10-PCS | Mod: S$GLB,,, | Performed by: INTERNAL MEDICINE

## 2019-12-03 PROCEDURE — 99395 PR PREVENTIVE VISIT,EST,18-39: ICD-10-PCS | Mod: 25,S$GLB,, | Performed by: INTERNAL MEDICINE

## 2019-12-03 PROCEDURE — 99999 PR PBB SHADOW E&M-EST. PATIENT-LVL III: CPT | Mod: PBBFAC,,, | Performed by: INTERNAL MEDICINE

## 2019-12-03 PROCEDURE — 90686 IIV4 VACC NO PRSV 0.5 ML IM: CPT | Mod: S$GLB,,, | Performed by: INTERNAL MEDICINE

## 2019-12-03 PROCEDURE — 90471 IMMUNIZATION ADMIN: CPT | Mod: S$GLB,,, | Performed by: INTERNAL MEDICINE

## 2019-12-03 RX ORDER — LITHIUM CARBONATE 450 MG/1
TABLET ORAL
COMMUNITY
End: 2019-12-09

## 2019-12-03 NOTE — TELEPHONE ENCOUNTER
Labs having difficulty getting the stickers of the labs that have to be done. Patient will come back tomorrow to have the labs done. Can you re-order the labs?

## 2019-12-03 NOTE — PROGRESS NOTES
Subjective:       Patient ID: Diana Vance is a 29 y.o. male.    Chief Complaint: Establish Care; Annual Exam; and Flu Vaccine    HPI   29 y.o. Male here for annual exam.     Vaccines: Influenza (2019); Tetanus (declined)  Eye exam: 2019    Exercise: not currently   Diet: regular     Past Medical History:  No date: Anxiety  No date: Depression  No date: Obesity (BMI 30-39.9)  History reviewed. No pertinent surgical history.  Social History    Socioeconomic History      Marital status: Single      Spouse name: Not on file      Number of children: Not on file      Years of education: Not on file      Highest education level: Not on file    Occupational History      Occupation:      Social Needs      Financial resource strain: Not hard at all      Food insecurity:        Worry: Never true        Inability: Never true      Transportation needs:        Medical: No        Non-medical: No    Tobacco Use      Smoking status: Never Smoker      Smokeless tobacco: Never Used    Substance and Sexual Activity      Alcohol use: Yes        Frequency: 2-3 times a week        Drinks per session: 3 or 4        Binge frequency: Less than monthly        Comment: social       Drug use: Never      Sexual activity: Yes        Partners: Female    Lifestyle      Physical activity:        Days per week: 4 days        Minutes per session: 60 min      Stress: Only a little    Relationships      Social connections:        Talks on phone: More than three times a week        Gets together: Once a week        Attends Denominational service: Not on file        Active member of club or organization: No        Attends meetings of clubs or organizations: Never        Relationship status: Never     Other Topics      Concerns:        Not on file    Social History Narrative      Not on file    Review of patient's allergies indicates:  No Known Allergies  Meliton Vance had no medications administered during this  visit.    Review of Systems   Constitutional: Negative for activity change, appetite change, chills, diaphoresis, fatigue, fever and unexpected weight change.   HENT: Negative for congestion, mouth sores, postnasal drip, rhinorrhea, sinus pressure, sneezing, sore throat, trouble swallowing and voice change.    Eyes: Negative for discharge, itching and visual disturbance.   Respiratory: Negative for cough, chest tightness, shortness of breath and wheezing.    Cardiovascular: Negative for chest pain, palpitations and leg swelling.   Gastrointestinal: Negative for abdominal pain, blood in stool, constipation, diarrhea, nausea and vomiting.   Endocrine: Negative for cold intolerance and heat intolerance.   Genitourinary: Negative for difficulty urinating, dysuria, flank pain, hematuria and urgency.   Musculoskeletal: Negative for arthralgias, back pain, myalgias and neck pain.   Skin: Negative for rash and wound.   Allergic/Immunologic: Negative for environmental allergies and food allergies.   Neurological: Negative for dizziness, tremors, seizures, syncope, weakness and headaches.   Hematological: Negative for adenopathy. Does not bruise/bleed easily.   Psychiatric/Behavioral: Positive for dysphoric mood. Negative for confusion, self-injury, sleep disturbance and suicidal ideas. The patient is nervous/anxious.        Objective:      Physical Exam   Constitutional: He is oriented to person, place, and time. He appears well-developed and well-nourished. No distress.   HENT:   Head: Normocephalic and atraumatic.   Right Ear: External ear normal.   Left Ear: External ear normal.   Nose: Nose normal.   Mouth/Throat: Oropharynx is clear and moist. No oropharyngeal exudate.   Eyes: Pupils are equal, round, and reactive to light. Conjunctivae and EOM are normal. Right eye exhibits no discharge. Left eye exhibits no discharge. No scleral icterus.   Neck: Normal range of motion. Neck supple. No JVD present. No thyromegaly  present.   Cardiovascular: Normal rate, regular rhythm, normal heart sounds and intact distal pulses.   No murmur heard.  Pulmonary/Chest: Effort normal and breath sounds normal. No respiratory distress. He has no wheezes. He has no rales.   Abdominal: Soft. Bowel sounds are normal. He exhibits no distension. There is no tenderness. There is no guarding.   Musculoskeletal: He exhibits no edema.   Lymphadenopathy:     He has no cervical adenopathy.   Neurological: He is alert and oriented to person, place, and time. No cranial nerve deficit. Coordination normal.   Skin: Skin is warm and dry. No rash noted. He is not diaphoretic. No pallor.   Psychiatric: He has a normal mood and affect. Judgment normal.       Assessment:       1. Annual physical exam    2. Social anxiety disorder    3. MDD (recurrent major depressive disorder) in remission    4. Obesity (BMI 30-39.9)        Plan:    1. Blood work ordered       Vaccines: Influenza (2019); Tetanus (declined)       Eye exam: 2019   2/3. Stable on current meds   4. Pt advised on proper diet/exercise for weight loss   5. F/u in 1 yr

## 2019-12-05 ENCOUNTER — OFFICE VISIT (OUTPATIENT)
Dept: PSYCHIATRY | Facility: CLINIC | Age: 29
End: 2019-12-05
Payer: COMMERCIAL

## 2019-12-05 DIAGNOSIS — F33.40 MDD (RECURRENT MAJOR DEPRESSIVE DISORDER) IN REMISSION: Primary | ICD-10-CM

## 2019-12-05 DIAGNOSIS — F41.9 ANXIETY: ICD-10-CM

## 2019-12-05 PROCEDURE — 99999 PR PBB SHADOW E&M-EST. PATIENT-LVL I: CPT | Mod: PBBFAC,,, | Performed by: SOCIAL WORKER

## 2019-12-05 PROCEDURE — 99999 PR PBB SHADOW E&M-EST. PATIENT-LVL I: ICD-10-PCS | Mod: PBBFAC,,, | Performed by: SOCIAL WORKER

## 2019-12-05 PROCEDURE — 90834 PR PSYCHOTHERAPY W/PATIENT, 45 MIN: ICD-10-PCS | Mod: S$GLB,,, | Performed by: SOCIAL WORKER

## 2019-12-05 PROCEDURE — 90834 PSYTX W PT 45 MINUTES: CPT | Mod: S$GLB,,, | Performed by: SOCIAL WORKER

## 2019-12-05 NOTE — PROGRESS NOTES
Individual Psychotherapy (PhD/LCSW)    12/5/2019    Site:  [facility]         Therapeutic Intervention: Met with patient.  Outpatient - Insight oriented psychotherapy 45 min - CPT code 77619    Chief complaint/reason for encounter: depression and anxiety     Interval history and content of current session: Pt was seen for an intake a number of months ago and was referred to group therapy but he only attended a few times and then gave up on it.  He returns to see me individually to work on some of his issues, mainly his anxiety.  He feels his depression is much better now but he engages in a great deal of obsessive thinking.  He has goals of what he wants to do but then talks himself out of them with his negative and obsessive thinking.  Recommended a book to him, Stopping the Noise in Your Head, and discussed turning irrational thoughts into more balanced thoughts.  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  Discussed ways to deal with this.  Also discussed how his generalized anxiety is now a problem for him since his depression is under control.    Treatment plan:  · Target symptoms: depression, anxiety   · Why chosen therapy is appropriate versus another modality: relevant to diagnosis  · Outcome monitoring methods: self-report, observation  · Therapeutic intervention type: insight oriented psychotherapy    Risk parameters:  Patient reports no suicidal ideation  Patient reports no homicidal ideation  Patient reports no self-injurious behavior  Patient reports no violent behavior    Verbal deficits: None    Patient's response to intervention:  The patient's response to intervention is motivated.    Progress toward goals and other mental status changes:  The patient's progress toward goals is good.    Diagnosis:     ICD-10-CM ICD-9-CM   1. MDD (recurrent major depressive disorder) in remission F33.40 296.35   2. Anxiety F41.9 300.00        Plan:  individual psychotherapy and medication management by physician    Return to clinic: 1 month    Length of Service (minutes): 45

## 2019-12-09 ENCOUNTER — OFFICE VISIT (OUTPATIENT)
Dept: PSYCHIATRY | Facility: CLINIC | Age: 29
End: 2019-12-09
Payer: COMMERCIAL

## 2019-12-09 VITALS
SYSTOLIC BLOOD PRESSURE: 131 MMHG | WEIGHT: 264.13 LBS | HEART RATE: 95 BPM | BODY MASS INDEX: 36.98 KG/M2 | HEIGHT: 71 IN | DIASTOLIC BLOOD PRESSURE: 79 MMHG

## 2019-12-09 DIAGNOSIS — F33.40 MDD (RECURRENT MAJOR DEPRESSIVE DISORDER) IN REMISSION: ICD-10-CM

## 2019-12-09 DIAGNOSIS — F40.10 SOCIAL ANXIETY DISORDER: ICD-10-CM

## 2019-12-09 DIAGNOSIS — F33.1 DEPRESSION, MAJOR, RECURRENT, MODERATE: Primary | ICD-10-CM

## 2019-12-09 PROCEDURE — 99212 PR OFFICE/OUTPT VISIT, EST, LEVL II, 10-19 MIN: ICD-10-PCS | Mod: S$GLB,,, | Performed by: PSYCHIATRY & NEUROLOGY

## 2019-12-09 PROCEDURE — 99999 PR PBB SHADOW E&M-EST. PATIENT-LVL II: ICD-10-PCS | Mod: PBBFAC,,, | Performed by: PSYCHIATRY & NEUROLOGY

## 2019-12-09 PROCEDURE — 99999 PR PBB SHADOW E&M-EST. PATIENT-LVL II: CPT | Mod: PBBFAC,,, | Performed by: PSYCHIATRY & NEUROLOGY

## 2019-12-09 PROCEDURE — 99212 OFFICE O/P EST SF 10 MIN: CPT | Mod: S$GLB,,, | Performed by: PSYCHIATRY & NEUROLOGY

## 2019-12-09 RX ORDER — TRIHEXYPHENIDYL HYDROCHLORIDE 2 MG/1
2 TABLET ORAL DAILY
Qty: 30 TABLET | Refills: 3 | Status: SHIPPED | OUTPATIENT
Start: 2019-12-09 | End: 2020-02-03 | Stop reason: SDUPTHER

## 2019-12-09 RX ORDER — ARIPIPRAZOLE 10 MG/1
10 TABLET ORAL DAILY
Qty: 30 TABLET | Refills: 3 | Status: SHIPPED | OUTPATIENT
Start: 2019-12-09 | End: 2020-02-03 | Stop reason: SDUPTHER

## 2019-12-09 RX ORDER — ESCITALOPRAM OXALATE 20 MG/1
20 TABLET ORAL DAILY
Qty: 30 TABLET | Refills: 3 | Status: SHIPPED | OUTPATIENT
Start: 2019-12-09 | End: 2020-02-03 | Stop reason: SDUPTHER

## 2019-12-09 RX ORDER — LITHIUM CARBONATE 450 MG/1
450 TABLET ORAL EVERY 12 HOURS
Qty: 60 TABLET | Refills: 3 | Status: SHIPPED | OUTPATIENT
Start: 2019-12-09 | End: 2020-02-03

## 2019-12-09 NOTE — PROGRESS NOTES
"Outpatient Psychiatry Follow-Up Visit (MD/NP)    12/9/2019    Clinical Status of Patient:  Outpatient (Ambulatory)    Chief Complaint:  Diana Vance is a 29 y.o. male who presents today for follow-up of depression, social anxiety disorder.  Met with patient.  Patient last seen by me 9/10/2019, at that time he was continued on lexapro 20 mg, artane 2 mg, and abilify 10 mg. Lithium was decreased to 450 qhs as it was unclear what the indications where. Requested records from previous provider, have not received any records back      Pt states that  Since last appointment he has been "bad and good at the same time". He says that with the reduced lithium he was feeling depressed and so he increased his lithium back to the previous dose of 460 mg BID.  He has noticed an improvement in his mood since increasing the lithium. However, he also made some changes during this same period of time which could have been contributing to mood improvement-he states that he was living alone (made him feel lonely) but then got roommates which helped. This period of low mood also coincides with work conflict in  October/novemeber.    Mood is "better" within the last couple weeks since increasing lithium. He visited sister for thanksgiving in [place] which he enjoyed. Sleep is "good", reports 8 hours of sleep and feels well rested in the morning. No issues with appetite. Energy is "fine". Some difficulty concentrating. Denies hopelessness. Denies SI/HI/IOR/TI/TW/TB/paranoia. No sx c/w manic episode. No objective s/sx of psychosis or govind.     Reports that his anxiety is ok but says he still worries about talking to strangers or clients who are "people of authority". Pt states that this only happens when topics are things he does not know well; says when topics are something he is knowledgeable about he does not have any difficulty.            Review of Systems   · PSYCHIATRIC: Pertinant items are noted in the narrative.    Past " "Medical, Family and Social History: The patient's past medical, family and social history have been reviewed and updated as appropriate within the electronic medical record - see encounter notes.    Compliance: yes    Side effects: None    Risk Parameters:  Patient reports no suicidal ideation  Patient reports no homicidal ideation  Patient reports no self-injurious behavior  Patient reports no violent behavior    Exam (detailed: at least 9 elements; comprehensive: all 15 elements)   Constitutional  Vitals:  Most recent vital signs, dated less than 90 days prior to this appointment, were reviewed.   Vitals:    12/09/19 1004   BP: 131/79   Pulse: 95   Weight: 119.8 kg (264 lb 1.8 oz)   Height: 5' 11" (1.803 m)        General:  unremarkable, age appropriate, casually dressed     Musculoskeletal  Muscle Strength/Tone:  no spasicity, no rigidity, no flaccidity, no dyskinesia, no tremor   Gait & Station:  non-ataxic, steady     Psychiatric  Speech:  no latency; no press   Mood & Affect:  "better"  congruent and appropriate, bright, full, reactive,    Thought Process:  normal and logical   Associations:  intact   Thought Content:  normal, no suicidality, no homicidality, delusions, or paranoia, patient making jokes   Insight:  intact, has awareness of illness   Judgement: behavior is adequate to circumstances   Orientation:  grossly intact   Memory: intact for content of interview   Language: grossly intact   Attention Span & Concentration:  able to focus   Fund of Knowledge:  intact and appropriate to age and level of education     Assessment and Diagnosis   Status/Progress: Based on the examination today, the patient's problem(s) is/are well controlled.  New problems have not been presented today.   Diagnostic uncertainty are complicating management of the primary condition.  The working differential for this patient includes MDD and social anxiety disorder. Would like to r/o SAD and bipolar disorder as patient presnted " on a complicated medication regimen. No objective evidence of psychosis or govind at this time. Pt reported not doing well at reduced lithium dose & he increased on his own. Would like to obtain records from previous provider. CARLA has been faxed, but nothing has been recieved. Pt is agreeable to obtain records and bring them to next appointment. Also agreeable to lithium level today.     General Impression:     MDD, in remission currently  R/O schizoaffective disorder, R/o Bipolar disorder  Social Anxiety disorder          Intervention/Counseling/Treatment Plan       Treatment Goals:  Specify outcomes written in observable, behavioral terms:   Anxiety: eliminating avoidance (specify : going out more with friends)  Depression: acquiring relapse prevention skills     Treatment Plan/Recommendations:   · Increase to previous dose lithium 450 BID.   · Continue lexapro 20 mg   · Continue artane 2 mg   · Continue abilify 10 mg  · Will consider further med adjustments once receive previous records-pt signed CARLA at visit 8/13-awaiting records. As on 12/9, still not received, pt will obtain records from office and bring to next visit  · Ordered lithium level and lithium monitoring labs  9/10/19- was not collected and appears it was cancelled by PCP. Will order lithium today. Other  Monitoring labs were cancelled by PCP and were re-ordered--CBC, TSH, CMP (Creatinine, Ca++)  · Continue therapy with Dr. Forrest       Return to Clinic: 2-3 months or sooner if needed  Jordyn Fraire MD  [facility] PGY3

## 2019-12-16 ENCOUNTER — PATIENT MESSAGE (OUTPATIENT)
Dept: PSYCHIATRY | Facility: CLINIC | Age: 29
End: 2019-12-16

## 2019-12-16 NOTE — PROGRESS NOTES
STAFF NOTE:  I have fully reviewed patient's medical record. Case formally discussed with resident and I concur with the resident's history, assessment, and recommendations.       Will need to get Lithium level and basic labs including CMP, TSH otherwise pt has been informed he will nto be prescribed any Lithium.

## 2020-01-16 ENCOUNTER — TELEPHONE (OUTPATIENT)
Dept: PSYCHIATRY | Facility: CLINIC | Age: 30
End: 2020-01-16

## 2020-01-20 ENCOUNTER — OFFICE VISIT (OUTPATIENT)
Dept: PSYCHIATRY | Facility: CLINIC | Age: 30
End: 2020-01-20
Payer: COMMERCIAL

## 2020-01-20 ENCOUNTER — LAB VISIT (OUTPATIENT)
Dept: LAB | Facility: HOSPITAL | Age: 30
End: 2020-01-20
Attending: PSYCHIATRY & NEUROLOGY
Payer: COMMERCIAL

## 2020-01-20 DIAGNOSIS — F33.1 DEPRESSION, MAJOR, RECURRENT, MODERATE: Primary | ICD-10-CM

## 2020-01-20 DIAGNOSIS — F33.1 DEPRESSION, MAJOR, RECURRENT, MODERATE: ICD-10-CM

## 2020-01-20 DIAGNOSIS — F40.10 SOCIAL ANXIETY DISORDER: ICD-10-CM

## 2020-01-20 LAB — LITHIUM SERPL-SCNC: <0.1 MMOL/L (ref 0.6–1.2)

## 2020-01-20 PROCEDURE — 80178 ASSAY OF LITHIUM: CPT

## 2020-01-20 PROCEDURE — 90834 PSYTX W PT 45 MINUTES: CPT | Mod: S$GLB,,, | Performed by: SOCIAL WORKER

## 2020-01-20 PROCEDURE — 36415 COLL VENOUS BLD VENIPUNCTURE: CPT

## 2020-01-20 PROCEDURE — 90834 PR PSYCHOTHERAPY W/PATIENT, 45 MIN: ICD-10-PCS | Mod: S$GLB,,, | Performed by: SOCIAL WORKER

## 2020-01-20 NOTE — PROGRESS NOTES
Individual Psychotherapy (PhD/LCSW)    1/20/2020    Site:  [facility]         Therapeutic Intervention: Met with patient.  Outpatient - Insight oriented psychotherapy 45 min - CPT code 55234    Chief complaint/reason for encounter: depression and anxiety     Interval history and content of current session: Pt returns for follow-up.  States he feels he is doing better.  He has been reading the book I suggested last time and finding it helpful.  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  He states that finishing things has always been a problem for him as well as putting things off to the last minute.  He is going to [place] in March for a month to visit his parents and he saw his sister in [place] over the holidays.  He is making friends at work and plans to move in with a work friend when his current lease is up as it will be much cheaper.    Treatment plan:  · Target symptoms: depression, anxiety   · Why chosen therapy is appropriate versus another modality: relevant to diagnosis  · Outcome monitoring methods: self-report, observation  · Therapeutic intervention type: insight oriented psychotherapy    Risk parameters:  Patient reports no suicidal ideation  Patient reports no homicidal ideation  Patient reports no self-injurious behavior  Patient reports no violent behavior    Verbal deficits: None    Patient's response to intervention:  The patient's response to intervention is motivated.    Progress toward goals and other mental status changes:  The patient's progress toward goals is good.    Diagnosis:     ICD-10-CM ICD-9-CM   1. Depression, major, recurrent, moderate F33.1 296.32   2. Social anxiety disorder F40.10 300.23       Plan:  individual psychotherapy and medication management by physician    Return to clinic: 1 month    Length of Service (minutes): 45

## 2020-02-03 ENCOUNTER — OFFICE VISIT (OUTPATIENT)
Dept: PSYCHIATRY | Facility: CLINIC | Age: 30
End: 2020-02-03
Payer: COMMERCIAL

## 2020-02-03 VITALS
HEART RATE: 98 BPM | WEIGHT: 270.5 LBS | SYSTOLIC BLOOD PRESSURE: 130 MMHG | DIASTOLIC BLOOD PRESSURE: 77 MMHG | BODY MASS INDEX: 37.87 KG/M2 | HEIGHT: 71 IN

## 2020-02-03 DIAGNOSIS — F40.10 SOCIAL ANXIETY DISORDER: ICD-10-CM

## 2020-02-03 DIAGNOSIS — F33.40 MDD (RECURRENT MAJOR DEPRESSIVE DISORDER) IN REMISSION: Primary | ICD-10-CM

## 2020-02-03 DIAGNOSIS — F33.1 DEPRESSION, MAJOR, RECURRENT, MODERATE: ICD-10-CM

## 2020-02-03 PROCEDURE — 99999 PR PBB SHADOW E&M-EST. PATIENT-LVL II: CPT | Mod: PBBFAC,,, | Performed by: PSYCHIATRY & NEUROLOGY

## 2020-02-03 PROCEDURE — 99213 PR OFFICE/OUTPT VISIT, EST, LEVL III, 20-29 MIN: ICD-10-PCS | Mod: S$GLB,,, | Performed by: PSYCHIATRY & NEUROLOGY

## 2020-02-03 PROCEDURE — 3008F PR BODY MASS INDEX (BMI) DOCUMENTED: ICD-10-PCS | Mod: CPTII,S$GLB,, | Performed by: PSYCHIATRY & NEUROLOGY

## 2020-02-03 PROCEDURE — 99213 OFFICE O/P EST LOW 20 MIN: CPT | Mod: S$GLB,,, | Performed by: PSYCHIATRY & NEUROLOGY

## 2020-02-03 PROCEDURE — 3008F BODY MASS INDEX DOCD: CPT | Mod: CPTII,S$GLB,, | Performed by: PSYCHIATRY & NEUROLOGY

## 2020-02-03 PROCEDURE — 99999 PR PBB SHADOW E&M-EST. PATIENT-LVL II: ICD-10-PCS | Mod: PBBFAC,,, | Performed by: PSYCHIATRY & NEUROLOGY

## 2020-02-03 RX ORDER — TRIHEXYPHENIDYL HYDROCHLORIDE 2 MG/1
2 TABLET ORAL DAILY
Qty: 30 TABLET | Refills: 3 | Status: SHIPPED | OUTPATIENT
Start: 2020-02-03 | End: 2020-06-02

## 2020-02-03 RX ORDER — ESCITALOPRAM OXALATE 20 MG/1
20 TABLET ORAL DAILY
Qty: 30 TABLET | Refills: 3 | Status: SHIPPED | OUTPATIENT
Start: 2020-02-03 | End: 2020-06-02

## 2020-02-03 RX ORDER — ARIPIPRAZOLE 10 MG/1
10 TABLET ORAL DAILY
Qty: 30 TABLET | Refills: 3 | Status: SHIPPED | OUTPATIENT
Start: 2020-02-03 | End: 2020-06-02

## 2020-02-03 NOTE — PROGRESS NOTES
"Outpatient Psychiatry Follow-Up Visit (MD/NP)    2/3/2020    Clinical Status of Patient:  Outpatient (Ambulatory)    Chief Complaint:  Diana Vance is a 29 y.o. male who presents today for follow-up of depression, social anxiety disorder.  Met with patient.  Patient last seen by me 12/09/2019, at that time he was continued on lexapro 20 mg, artane 2 mg, and abilify 10 mg. Lithium was stopped but at last appointment he reported that he restarted it at 450 mg BID. However, lithium level on 1/20/20 was <0.01 indicating that medications are not being taken consistently. Requested records from previous provider, have not received any records back    Pt presents with bright affect and states that he has been doing  "good" since his last appointment and expresses that he has found lexapro to be extremely helpful for mood and associated sx of depression. States he is sleeping well (8-9 hours a night), feels well rested, good energy, good motivation. Denies feeling depressed. No issues with concentration, denies guilt or hopelessness. Pt reports increased appetite but states that he has joined [employer] and plans on attending classes 4x a week in order to lose weight.  Pt states that he has started looking for new jobs and is considering a job at a tech start up in [place] but has not completed a "take home assessment" that he was asked to submit because he has spent several days smoking marijuana with his roommate. Pt denies regular use, has only done it on 3 separate occasions and does not use consistently.  Reports some anxiety related to an upcoming  speed dating event on on linares/s day but otherwise no issues. Pt states that he has not been taking lithium  Consistently while discussing recent lithium level, says he is taking "once every few days", agrees to stop medication and monitor sx. He has an upcoming trip to [place] in March to visit his parents which he is looking forward to. Has found therapy " "helpful in regards to anxiety and depressive sx. Denies SI/HI/AVH. No objective ssx of psychosis or govind. .                Review of Systems   · PSYCHIATRIC: Pertinant items are noted in the narrative.  MEDICAL REVIEW OF SYSTEMS  History obtained from the patient   General : NO chills or fever   Eyes: NO  visual changes   ENT: NO  nasal discharge or sore throat   Endocrine:  + weight gain (gained about 6 lbs from last visit on chart review)   Dermatological: NO rashes   Respiratory: NO cough, shortness of breath   Cardiovascular: NO chest pain, palpitations or racing heart   Gastrointestinal: NO nausea, vomiting, constipation or diarrhea   Musculoskeletal: NO muscle pain or stiffness   Neurological: NO  dizziness, headaches or tremors   Psychiatric: please see HPI      Past Medical, Family and Social History: The patient's past medical, family and social history have been reviewed and updated as appropriate within the electronic medical record - see encounter notes.    Compliance: yes    Side effects: None    Risk Parameters:  Patient reports no suicidal ideation  Patient reports no homicidal ideation  Patient reports no self-injurious behavior  Patient reports no violent behavior    Exam (detailed: at least 9 elements; comprehensive: all 15 elements)   Constitutional  Vitals:  Most recent vital signs, dated less than 90 days prior to this appointment, were reviewed.   Vitals:    02/03/20 1003   BP: 130/77   Pulse: 98   Weight: 122.7 kg (270 lb 8.1 oz)   Height: 5' 11" (1.803 m)        General:  unremarkable, age appropriate, casually dressed     Musculoskeletal  Muscle Strength/Tone:  no spasicity, no rigidity, no flaccidity, no dyskinesia, no tremor   Gait & Station:  non-ataxic, steady     Psychiatric  Speech:  no latency; no press   Mood & Affect:  "good"  congruent and appropriate, bright, full, reactive,    Thought Process:  normal and logical   Associations:  intact   Thought Content:  normal, no " suicidality, no homicidality, delusions, or paranoia, patient making jokes   Insight:  intact, has awareness of illness   Judgement: behavior is adequate to circumstances   Orientation:  grossly intact   Memory: intact for content of interview   Language: grossly intact   Attention Span & Concentration:  able to focus   Fund of Knowledge:  intact and appropriate to age and level of education     Assessment and Diagnosis   Status/Progress: Based on the examination today, the patient's problem(s) is/are well controlled.  New problems have not been presented today.   Diagnostic uncertainty are complicating management of the primary condition.  The working differential for this patient includes MDD and social anxiety disorder. Would like to r/o SAD and bipolar disorder as patient presnted on a complicated medication regimen. No objective evidence of psychosis or govind at any previous appointments. Pt was instructed to stop lithium, but he resumed once mood worsened at previous appointment although takes inconsistently (once every few days per patient; jan 2020 lithium level undectable). Unlikely that pt is getting any therapeutic benefit from lithium at this time; agreeable to discontinue. Would like to obtain records from previous provider. [facility]LA has been faxed, but nothing has been recieved. It remains unclear why lithium was started as by hx pt has never met criteria for govind or hypmania, possibly was started as adjunct to antidepressent as pt was suicidal at time of inpatient admission when medication was started.     General Impression:     MDD, in remission currently  R/O schizoaffective disorder, R/o Bipolar disorder  Social Anxiety disorder          Intervention/Counseling/Treatment Plan       Treatment Goals:  Specify outcomes written in observable, behavioral terms:   Anxiety: eliminating avoidance (specify : going out more with friends)  Depression: acquiring relapse prevention skills     Treatment  Plan/Recommendations:   · Discontinue  lithium 450 BID.   · Continue lexapro 20 mg   · Continue artane 2 mg   · Continue abilify 10 mg  · Will consider further med adjustments once receive previous records-pt signed CARLA at visit 8/13-awaiting records. As on 2/3, still not received  · Continue therapy with Dr. Forrest       Return to Clinic: 2-3 months or sooner if needed  Jordyn Fraire MD  [employer] [facility] PGY3

## 2021-01-04 ENCOUNTER — PATIENT MESSAGE (OUTPATIENT)
Dept: ADMINISTRATIVE | Facility: HOSPITAL | Age: 31
End: 2021-01-04

## 2021-04-05 ENCOUNTER — PATIENT MESSAGE (OUTPATIENT)
Dept: ADMINISTRATIVE | Facility: HOSPITAL | Age: 31
End: 2021-04-05

## 2021-07-06 ENCOUNTER — PATIENT MESSAGE (OUTPATIENT)
Dept: ADMINISTRATIVE | Facility: HOSPITAL | Age: 31
End: 2021-07-06

## 2021-10-04 ENCOUNTER — PATIENT MESSAGE (OUTPATIENT)
Dept: ADMINISTRATIVE | Facility: HOSPITAL | Age: 31
End: 2021-10-04

## 2022-01-18 ENCOUNTER — PATIENT MESSAGE (OUTPATIENT)
Dept: ADMINISTRATIVE | Facility: HOSPITAL | Age: 32
End: 2022-01-18
Payer: COMMERCIAL

## 2022-03-16 ENCOUNTER — PATIENT MESSAGE (OUTPATIENT)
Dept: ADMINISTRATIVE | Facility: HOSPITAL | Age: 32
End: 2022-03-16
Payer: COMMERCIAL

## 2023-05-24 NOTE — PROGRESS NOTES
Group Psychotherapy    Site: [facility]    Clinical status of patient: Outpatient    7/3/2019    Length of service:[id number]    Referred by: STEVEN     Number of patients in attendance: 9    Target symptoms: depression, anxiety , adjustment, grief, work stress    Patient's response to intervention:  The patient's response to intervention is active listening, frequent questions, self-disclosure, feedback to other patients.    Progress toward goals and other mental status changes:  The patient's progress toward goals is fair .    Interval history: first day in group therapy, discussed coping skills, family, how to open up and talk, trust, and anxiety, self-esteem, and how to improve his self-confidence, peer and social skills, relationship and communications skills, and learn to relax all focused on.    Diagnosis: social anxiety disorder    Plan: individual psychotherapy, group psychotherapy, consult psychiatrist for medication evaluation and medication management by physician    Return to clinic: 1 week         fair plus

## 2024-10-01 ENCOUNTER — PATIENT MESSAGE (OUTPATIENT)
Dept: INTERNAL MEDICINE | Facility: CLINIC | Age: 34
End: 2024-10-01
Payer: COMMERCIAL